# Patient Record
Sex: FEMALE | Race: WHITE | NOT HISPANIC OR LATINO | URBAN - NONMETROPOLITAN AREA
[De-identification: names, ages, dates, MRNs, and addresses within clinical notes are randomized per-mention and may not be internally consistent; named-entity substitution may affect disease eponyms.]

---

## 2023-03-10 ENCOUNTER — APPOINTMENT (OUTPATIENT)
Dept: URBAN - NONMETROPOLITAN AREA SURGERY 8 | Age: 58
Setting detail: DERMATOLOGY
End: 2023-03-10

## 2023-03-10 VITALS
TEMPERATURE: 97.4 F | SYSTOLIC BLOOD PRESSURE: 121 MMHG | DIASTOLIC BLOOD PRESSURE: 76 MMHG | RESPIRATION RATE: 16 BRPM | HEART RATE: 78 BPM

## 2023-03-10 VITALS
DIASTOLIC BLOOD PRESSURE: 73 MMHG | HEART RATE: 72 BPM | SYSTOLIC BLOOD PRESSURE: 115 MMHG | RESPIRATION RATE: 6 BRPM | TEMPERATURE: 97.5 F

## 2023-03-10 PROBLEM — C44.319 BASAL CELL CARCINOMA OF SKIN OF OTHER PARTS OF FACE: Status: ACTIVE | Noted: 2023-03-10

## 2023-03-10 PROCEDURE — 12052 INTMD RPR FACE/MM 2.6-5.0 CM: CPT

## 2023-03-10 PROCEDURE — OTHER MIPS QUALITY: OTHER

## 2023-03-10 PROCEDURE — 17311 MOHS 1 STAGE H/N/HF/G: CPT

## 2023-03-10 PROCEDURE — OTHER MOHS SURGERY: OTHER

## 2023-03-10 NOTE — PROCEDURE: MOHS SURGERY
Normal Closure 2 Information: This tab is for additional flaps and grafts, including complex repair and grafts and complex repair and flaps. You can also specify a different location for the additional defect, if the location is the same you do not need to select a new one. We will insert the automated text for the repair you select below just as we do for solitary flaps and grafts. Please note that at this time if you select a location with a different insurance zone you will need to override the ICD10 and CPT if appropriate.

## 2023-03-10 NOTE — PROCEDURE: MOHS SURGERY
Discussion/Summary   BLOOD TEST GOOD   CHOL BETTER        Verified Results  COMP METABOLIC PANEL WITH LIPID PANEL AND CBCA (CPNL,CBCA,HDL) 03Oct2018 04:45PM [facility]     Test Name Result Flag Reference   WHITE BLOOD COUNT 4.0 K/mcL L 4.2-11.0   RED CELL COUNT 4.38 mil/mcL L 4.50-5.90   HEMOGLOBIN 13.2 g/dl  13.0-17.0   HEMATOCRIT 41.0 %  39.0-51.0   MEAN CORPUSCULAR VOLUME 93.6 fL  78.0-100.0   MEAN CORPUSCULAR HEMOGLOBIN 30.1 pg  26.0-34.0   MEAN CORPUSCULAR HGB CONC 32.2 g/dl  32.0-36.5   RDW-CV 13.0 %  11.0-15.0   PLATELET COUNT 246 K/mcL  140-450   EDITH% 53 %     LYM% 35 %     MON% 9 %     EOS% 3 %     BASO% 0 %     EDITH ABS 2.1 K/mcL  1.8-7.7   LYM ABS 1.4 K/mcL  1.0-4.0   MON ABS 0.3 K/mcL  0.3-0.9   EOS ABS 0.1 K/mcL  0.1-0.5   BASO ABS 0.0 K/mcL  0.0-0.3   FASTING STATUS UNKNOWN hrs     SODIUM 140 mmol/L  135-145   POTASSIUM 3.8 mmol/L  3.4-5.1   CHLORIDE 103 mmol/L     CARBON DIOXIDE 27 mmol/L  21-32   ANION GAP 14 mmol/L  10-20   GLUCOSE 83 mg/dl  65-99   BUN 12 mg/dl  6-20   CREATININE 1.05 mg/dl  0.67-1.17   GFR EST.AFRICAN AMER 81     eGFR 60 - 89 mL/min/1.73m2 = Mild decrease in kidney function.   GFR EST.NONAFRI AMER 70     eGFR 60 - 89 mL/min/1.73m2 = Mild decrease in kidney function.   BUN/CREATININE RATIO 11  7-25   BILIRUBIN TOTAL 0.4 mg/dl  0.2-1.0   GOT/AST 27 Units/L  <38   ALKALINE PHOSPHATASE 49 Units/L     ALBUMIN 4.1 g/dl  3.6-5.1   TOTAL PROTEIN 8.2 g/dl  6.4-8.2   GLOBULIN (CALCULATED) 4.1 g/dl H 2.0-4.0   A/G RATIO 1.0  1.0-2.4   CALCIUM 8.7 mg/dl  8.4-10.2   GPT/ALT 25 Units/L  <79   CHOLESTEROL 198 mg/dl  <200   Desirable            <200  Borderline High      200 to 239  High                 >=240   HDL CHOLESTEROL 58 mg/dl  >39   Low            <40  Borderline Low 40 to 49  Near Optimal   50 to 59  Optimal        >=60   TRIGLYCERIDES 82 mg/dl  <150   Normal                   <150  Borderline High          150 to 199  High                     200 to 499  Very High                 >=500   LDL CHOLESTEROL (CALCULATED) 124 mg/dl  <130   OPTIMAL               <100  NEAR OPTIMAL          100-129  BORDERLINE HIGH       130-159  HIGH                  160-189  VERY HIGH             >=190   NON-HDL CHOLESTEROL 140 mg/dl     Therapeutic Target:  CHD and risk equivalents <130  Multiple risk factors    <160  0 to 1 risk factors      <190   CHOLESTEROL/HDL RATIO 3.4  <4.5   DIFF TYPE      AUTOMATED DIFFERENTIAL   FASTING STATUS UNKNOWN hrs     NRBC 0 /100 WBC  0   ABSOLUTE IMMATURE GRANULOCYTES 0.0 K/mcL  0-0.2   PERCENT IMMATURE GRANULOCYTES 0 %       HEMOGLOBIN A1C GLYCOSYLATED 03Oct2018 04:45PM [facility]     Test Name Result Flag Reference   HEMOGLOBIN A1C GLYH 6.1 % H 4.5-5.6   ----DIABETIC SCREENING---  NON DIABETIC                 <5.7%  INCREASED RISK                5.7-6.4%  DIAGNOSTIC FOR DIABETES      >6.4%     ----DIABETIC CONTROL---     A1C%           eAG mg/dL  6.0            126  6.5            140  7.0            154  7.5            169  8.0            183  8.5            197  9.0            212  9.5            226  10.0           240        Partial Purse String (Intermediate) Text: Given the location of the defect and the characteristics of the surrounding skin an intermediate purse string closure was deemed most appropriate.  Undermining was performed circumferentially around the surgical defect.  A purse string suture was then placed and tightened. Wound tension only allowed a partial closure of the circular defect.

## 2023-07-17 NOTE — PROCEDURE: MOHS SURGERY
[Negative] : Breast Otolaryngologist Procedure Text (A): After obtaining clear surgical margins the patient was sent to otolaryngology for surgical repair.  The patient understands they will receive post-surgical care and follow-up from the referring physician's office.

## 2024-09-28 NOTE — PROCEDURE: MOHS SURGERY
English Cheiloplasty (Complex) Text: A decision was made to reconstruct the defect with a  cheiloplasty.  The defect was undermined extensively.  Additional orbicularis oris muscle was excised with a 15 blade scalpel.  The defect was converted into a full thickness wedge to facilite a better cosmetic result.  Small vessels were then tied off with 5-0 monocyrl. The orbicularis oris, superficial fascia, adipose and dermis were then reapproximated.  After the deeper layers were approximated the epidermis was reapproximated with particular care given to realign the vermilion border.